# Patient Record
Sex: FEMALE | Race: BLACK OR AFRICAN AMERICAN | Employment: UNEMPLOYED | URBAN - NONMETROPOLITAN AREA
[De-identification: names, ages, dates, MRNs, and addresses within clinical notes are randomized per-mention and may not be internally consistent; named-entity substitution may affect disease eponyms.]

---
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
## 2024-04-25 ENCOUNTER — HOSPITAL ENCOUNTER (EMERGENCY)
Facility: HOSPITAL | Age: 19
Discharge: HOME | End: 2024-04-25
Attending: EMERGENCY MEDICINE

## 2024-04-25 VITALS
RESPIRATION RATE: 17 BRPM | HEART RATE: 91 BPM | BODY MASS INDEX: 24.88 KG/M2 | DIASTOLIC BLOOD PRESSURE: 74 MMHG | TEMPERATURE: 98.1 F | OXYGEN SATURATION: 100 % | HEIGHT: 63 IN | SYSTOLIC BLOOD PRESSURE: 128 MMHG | WEIGHT: 140.4 LBS

## 2024-04-25 DIAGNOSIS — R30.0 DYSURIA: Primary | ICD-10-CM

## 2024-04-25 LAB
ANION GAP SERPL CALC-SCNC: 8 MMOL/L (ref 5–15)
APPEARANCE UR: CLEAR
BASOPHILS # BLD: 0 K/UL (ref 0–0.1)
BASOPHILS NFR BLD: 0 % (ref 0–1)
BILIRUB UR QL: NEGATIVE
BUN SERPL-MCNC: 7 MG/DL (ref 6–20)
BUN/CREAT SERPL: 10 (ref 12–20)
CA-I BLD-MCNC: 9.1 MG/DL (ref 8.5–10.1)
CHLORIDE SERPL-SCNC: 102 MMOL/L (ref 97–108)
CO2 SERPL-SCNC: 29 MMOL/L (ref 21–32)
COLOR UR: NORMAL
CREAT SERPL-MCNC: 0.71 MG/DL (ref 0.55–1.02)
DIFFERENTIAL METHOD BLD: ABNORMAL
EOSINOPHIL # BLD: 0.1 K/UL (ref 0–0.4)
EOSINOPHIL NFR BLD: 1 % (ref 0–7)
ERYTHROCYTE [DISTWIDTH] IN BLOOD BY AUTOMATED COUNT: 13.5 % (ref 11.5–14.5)
GLUCOSE SERPL-MCNC: 114 MG/DL (ref 65–100)
GLUCOSE UR STRIP.AUTO-MCNC: NEGATIVE MG/DL
HCT VFR BLD AUTO: 33.5 % (ref 35–47)
HGB BLD-MCNC: 11.4 G/DL (ref 11.5–16)
HGB UR QL STRIP: NEGATIVE
IMM GRANULOCYTES # BLD AUTO: 0.1 K/UL (ref 0–0.04)
IMM GRANULOCYTES NFR BLD AUTO: 1 % (ref 0–0.5)
KETONES UR QL STRIP.AUTO: NEGATIVE MG/DL
LEUKOCYTE ESTERASE UR QL STRIP.AUTO: NEGATIVE
LYMPHOCYTES # BLD: 2.8 K/UL (ref 0.8–3.5)
LYMPHOCYTES NFR BLD: 34 % (ref 12–49)
MCH RBC QN AUTO: 28.9 PG (ref 26–34)
MCHC RBC AUTO-ENTMCNC: 34 G/DL (ref 30–36.5)
MCV RBC AUTO: 84.8 FL (ref 80–99)
MONOCYTES # BLD: 0.6 K/UL (ref 0–1)
MONOCYTES NFR BLD: 7 % (ref 5–13)
NEUTS SEG # BLD: 4.6 K/UL (ref 1.8–8)
NEUTS SEG NFR BLD: 57 % (ref 32–75)
NITRITE UR QL STRIP.AUTO: NEGATIVE
NRBC # BLD: 0 K/UL (ref 0–0.01)
NRBC BLD-RTO: 0 PER 100 WBC
PH UR STRIP: 6.5 (ref 5–8)
PLATELET # BLD AUTO: 317 K/UL (ref 150–400)
PMV BLD AUTO: 9.4 FL (ref 8.9–12.9)
POTASSIUM SERPL-SCNC: 4 MMOL/L (ref 3.5–5.1)
PROT UR STRIP-MCNC: NEGATIVE MG/DL
RBC # BLD AUTO: 3.95 M/UL (ref 3.8–5.2)
RBC MORPH BLD: ABNORMAL
SODIUM SERPL-SCNC: 139 MMOL/L (ref 136–145)
SP GR UR REFRACTOMETRY: 1.01 (ref 1–1.03)
UROBILINOGEN UR QL STRIP.AUTO: 1 EU/DL (ref 0.2–1)
WBC # BLD AUTO: 8.2 K/UL (ref 3.6–11)

## 2024-04-25 PROCEDURE — 80048 BASIC METABOLIC PNL TOTAL CA: CPT

## 2024-04-25 PROCEDURE — 85025 COMPLETE CBC W/AUTO DIFF WBC: CPT

## 2024-04-25 PROCEDURE — 99283 EMERGENCY DEPT VISIT LOW MDM: CPT

## 2024-04-25 PROCEDURE — 6370000000 HC RX 637 (ALT 250 FOR IP): Performed by: EMERGENCY MEDICINE

## 2024-04-25 PROCEDURE — 81003 URINALYSIS AUTO W/O SCOPE: CPT

## 2024-04-25 RX ORDER — IBUPROFEN 600 MG/1
600 TABLET ORAL
Status: COMPLETED | OUTPATIENT
Start: 2024-04-25 | End: 2024-04-25

## 2024-04-25 RX ORDER — IBUPROFEN 600 MG/1
600 TABLET ORAL 3 TIMES DAILY PRN
Qty: 30 TABLET | Refills: 0 | Status: SHIPPED | OUTPATIENT
Start: 2024-04-25

## 2024-04-25 RX ADMIN — IBUPROFEN 600 MG: 600 TABLET, FILM COATED ORAL at 22:46

## 2024-04-25 ASSESSMENT — ENCOUNTER SYMPTOMS
RESPIRATORY NEGATIVE: 1
ABDOMINAL PAIN: 0
EYES NEGATIVE: 1
VOMITING: 0
NAUSEA: 0
ALLERGIC/IMMUNOLOGIC NEGATIVE: 1
GASTROINTESTINAL NEGATIVE: 1

## 2024-04-25 ASSESSMENT — PAIN - FUNCTIONAL ASSESSMENT: PAIN_FUNCTIONAL_ASSESSMENT: 0-10

## 2024-04-25 ASSESSMENT — PAIN SCALES - GENERAL: PAINLEVEL_OUTOF10: 5

## 2024-04-25 ASSESSMENT — LIFESTYLE VARIABLES
HOW OFTEN DO YOU HAVE A DRINK CONTAINING ALCOHOL: NEVER
HOW MANY STANDARD DRINKS CONTAINING ALCOHOL DO YOU HAVE ON A TYPICAL DAY: PATIENT DOES NOT DRINK

## 2024-04-26 NOTE — ED TRIAGE NOTES
Pt states that for the last 4-5 days she has been pain in her pelvic region that worsens when she urinates. Pt rates pain at this time 5/10 and denies any medication PTA. Pt states that she has been feeling dizzy intermittently for the last 5 hours.

## 2024-04-26 NOTE — ED PROVIDER NOTES
Opening: Spontaneous  Best Verbal Response: Oriented  Best Motor Response: Obeys commands  Veto Coma Scale Score: 15                     CIWA Assessment  BP: 128/74  Pulse: 91                 PHYSICAL EXAM       ED Triage Vitals [04/25/24 2124]   BP Temp Temp src Pulse Respirations SpO2 Height Weight - Scale   128/74 98.1 °F (36.7 °C) -- 91 17 100 % 1.6 m (5' 3\") 63.7 kg (140 lb 6.4 oz)       Physical Exam  Vitals and nursing note reviewed.   HENT:      Head: Normocephalic and atraumatic.      Nose: Nose normal.   Eyes:      Extraocular Movements: Extraocular movements intact.      Pupils: Pupils are equal, round, and reactive to light.   Cardiovascular:      Rate and Rhythm: Normal rate and regular rhythm.   Pulmonary:      Breath sounds: Normal breath sounds.   Abdominal:      General: Abdomen is flat. Bowel sounds are normal.      Palpations: Abdomen is soft.   Musculoskeletal:         General: Normal range of motion.      Cervical back: Normal range of motion and neck supple.   Skin:     General: Skin is warm.   Neurological:      General: No focal deficit present.      Mental Status: She is alert.         DIAGNOSTIC RESULTS     EKG: All EKG's are interpreted by the Emergency Department Physician who either signs or Co-signs this chart in the absence of a cardiologist.        RADIOLOGY:   Non-plain film images such as CT, Ultrasound and MRI are read by the radiologist. Plain radiographic images are visualized and preliminarily interpreted by the emergency physician with the below findings:        Interpretation per the Radiologist below, if available at the time of this note:    No orders to display         ED BEDSIDE ULTRASOUND:   Performed by ED Physician - none    LABS:  Labs Reviewed   URINALYSIS       All other labs were within normal range or not returned as of this dictation.    EMERGENCY DEPARTMENT COURSE and DIFFERENTIAL DIAGNOSIS/MDM:   Vitals:    Vitals:    04/25/24 2124   BP: 128/74   Pulse: 91

## 2024-10-06 ENCOUNTER — HOSPITAL ENCOUNTER (EMERGENCY)
Facility: HOSPITAL | Age: 19
Discharge: HOME | End: 2024-10-06
Attending: EMERGENCY MEDICINE
Payer: MEDICAID

## 2024-10-06 VITALS
TEMPERATURE: 97.5 F | SYSTOLIC BLOOD PRESSURE: 135 MMHG | HEIGHT: 63 IN | DIASTOLIC BLOOD PRESSURE: 85 MMHG | RESPIRATION RATE: 18 BRPM | HEART RATE: 82 BPM | BODY MASS INDEX: 24.8 KG/M2 | OXYGEN SATURATION: 100 % | WEIGHT: 140 LBS

## 2024-10-06 DIAGNOSIS — N30.00 ACUTE CYSTITIS WITHOUT HEMATURIA: Primary | ICD-10-CM

## 2024-10-06 LAB
APPEARANCE UR: ABNORMAL
BACTERIA URNS QL MICRO: ABNORMAL /HPF
BILIRUB UR QL: NEGATIVE
COLOR UR: ABNORMAL
GLUCOSE UR STRIP.AUTO-MCNC: NEGATIVE MG/DL
HGB UR QL STRIP: NEGATIVE
KETONES UR QL STRIP.AUTO: NEGATIVE MG/DL
LEUKOCYTE ESTERASE UR QL STRIP.AUTO: ABNORMAL
NITRITE UR QL STRIP.AUTO: NEGATIVE
PH UR STRIP: 7 (ref 5–8)
PROT UR STRIP-MCNC: NEGATIVE MG/DL
RBC #/AREA URNS HPF: ABNORMAL /HPF (ref 0–3)
SP GR UR REFRACTOMETRY: 1.01 (ref 1–1.03)
URINE CULTURE IF INDICATED: ABNORMAL
UROBILINOGEN UR QL STRIP.AUTO: 1 EU/DL (ref 0.2–1)
WBC URNS QL MICRO: ABNORMAL /HPF (ref 0–5)

## 2024-10-06 PROCEDURE — 87147 CULTURE TYPE IMMUNOLOGIC: CPT

## 2024-10-06 PROCEDURE — 6370000000 HC RX 637 (ALT 250 FOR IP): Performed by: EMERGENCY MEDICINE

## 2024-10-06 PROCEDURE — 87086 URINE CULTURE/COLONY COUNT: CPT

## 2024-10-06 PROCEDURE — 99283 EMERGENCY DEPT VISIT LOW MDM: CPT

## 2024-10-06 PROCEDURE — 81001 URINALYSIS AUTO W/SCOPE: CPT

## 2024-10-06 RX ORDER — CEPHALEXIN 500 MG/1
500 CAPSULE ORAL 3 TIMES DAILY
Qty: 15 CAPSULE | Refills: 0 | Status: SHIPPED | OUTPATIENT
Start: 2024-10-06 | End: 2024-10-11

## 2024-10-06 RX ORDER — PHENAZOPYRIDINE HYDROCHLORIDE 95 MG/1
95 TABLET ORAL 3 TIMES DAILY PRN
Qty: 9 TABLET | Refills: 0 | Status: SHIPPED | OUTPATIENT
Start: 2024-10-06 | End: 2024-10-09

## 2024-10-06 RX ADMIN — CEPHALEXIN 500 MG: 250 CAPSULE ORAL at 14:06

## 2024-10-06 ASSESSMENT — PAIN DESCRIPTION - LOCATION: LOCATION: FLANK

## 2024-10-06 ASSESSMENT — PAIN - FUNCTIONAL ASSESSMENT
PAIN_FUNCTIONAL_ASSESSMENT: 0-10
PAIN_FUNCTIONAL_ASSESSMENT: 0-10

## 2024-10-06 ASSESSMENT — PAIN SCALES - GENERAL
PAINLEVEL_OUTOF10: 5
PAINLEVEL_OUTOF10: 5

## 2024-10-06 NOTE — DISCHARGE INSTRUCTIONS
Thank you for choosing our Emergency Department for your care.  It is our privilege to care for you in your time of need.  In the next several days, you may receive a survey via email or mailed to your home about your experience with our team.  We would greatly appreciate you taking a few minutes to complete the survey, as we use this information to learn what we have done well and what we could be doing better. Thank you for trusting us with your care!    Below you will find a list of your tests from today's visit.   Labs  Recent Results (from the past 12 hour(s))   Urinalysis with Reflex to Culture    Collection Time: 10/06/24  1:00 PM    Specimen: Urine   Result Value Ref Range    Color, UA Yellow/Straw      Appearance Hazy (A) Clear      Specific Gravity, UA 1.015 1.003 - 1.030      pH, Urine 7.0 5.0 - 8.0      Protein, UA Negative Negative mg/dL    Glucose, Ur Negative Negative mg/dL    Ketones, Urine Negative Negative mg/dL    Bilirubin, Urine Negative Negative      Blood, Urine Negative Negative      Urobilinogen, Urine 1.0 0.2 - 1.0 EU/dL    Nitrite, Urine Negative Negative      Leukocyte Esterase, Urine Large (A) Negative      WBC, UA 20-50 0 - 5 /hpf    RBC, UA 0-5 0 - 3 /hpf    BACTERIA, URINE 1+ (A) Negative /hpf    Urine Culture if Indicated Urine Culture Ordered (A) Culture not indicated by UA result         Radiologic Studies  No orders to display     ------------------------------------------------------------------------------------------------------------  The evaluation and treatment you received in the Emergency Department were for an urgent problem. It is important that you follow-up with a doctor, nurse practitioner, or physician assistant to:  (1) confirm your diagnosis,  (2) re-evaluation of changes in your illness and treatment, and (3) for ongoing care. Please take your discharge instructions with you when you go to your follow-up appointment.     If you have any problem arranging a

## 2024-10-06 NOTE — ED PROVIDER NOTES
stone, STI.  Will obtain UA.     Discussed with the patient diagnosis and test results and all questioned fully answered.  They understand the importance of staying well hydrated, taking antibiotics as prescribed to completion and using OTC pyridium as desired.  She will PCP if any problems arise.     We discussed follow up with urology/GYN due to recurrent symptoms. No abd ttp or flank pain to concern for intraabdominal catastrophe. Return precautions dicussed.       Records Reviewed (source and summary of external notes): Prior medical records and Nursing notes.    Vitals:    Vitals:    10/06/24 1258   BP: 135/85   Pulse: 82   Resp: 18   Temp: 97.5 °F (36.4 °C)   SpO2: 100%   Weight: 63.5 kg (140 lb)   Height: 1.6 m (5' 3\")        ED COURSE          Patient was given the following medications:  Medications   cephALEXin (KEFLEX) capsule 500 mg (500 mg Oral Given 10/6/24 1406)       CONSULTS: See ED Course/MDM for further details.  None        PROCEDURES   Unless otherwise noted above, none  Procedures      CRITICAL CARE TIME   Patient does not meet Critical Care Time, 0 minutes    ED IMPRESSION     1. Acute cystitis without hematuria          DISPOSITION/PLAN   DISPOSITION Decision To Discharge 10/06/2024 02:09:55 PM  Condition at Disposition: Data Unavailable    Discharge Note: The patient is stable for discharge home. The signs, symptoms, diagnosis, and discharge instructions have been discussed, understanding conveyed, and agreed upon. The patient is to follow up as recommended or return to ER should their symptoms worsen.      PATIENT REFERRED TO:  your primary care provider    Call in 3 days      Rukhsana Lambert MD  [address] [place] [address]  [address] [place] [id number] [phone]    Schedule an appointment as soon as possible for a visit in 1 week  for followup    Rey Salazar MD  [address] [facility] [address] [phone]    Schedule an appointment as soon as possible for a visit

## 2024-10-06 NOTE — ED TRIAGE NOTES
Pt reports bilateral flank pain x4 months in which she presents with either urinary frequency or retention. Today she rates bilateral pain at 5/10 with urinary discomfort.

## 2024-10-08 LAB
BACTERIA SPEC CULT: ABNORMAL
COLONY COUNT, CNT: ABNORMAL
COLONY COUNT, CNT: ABNORMAL
Lab: ABNORMAL

## 2025-05-06 ENCOUNTER — HOSPITAL ENCOUNTER (EMERGENCY)
Facility: HOSPITAL | Age: 20
Discharge: HOME | End: 2025-05-06
Attending: EMERGENCY MEDICINE
Payer: MEDICAID

## 2025-05-06 VITALS
HEART RATE: 72 BPM | DIASTOLIC BLOOD PRESSURE: 73 MMHG | RESPIRATION RATE: 18 BRPM | BODY MASS INDEX: 24.8 KG/M2 | SYSTOLIC BLOOD PRESSURE: 117 MMHG | WEIGHT: 140 LBS | OXYGEN SATURATION: 100 % | TEMPERATURE: 97.8 F | HEIGHT: 63 IN

## 2025-05-06 DIAGNOSIS — L30.9 DERMATITIS: ICD-10-CM

## 2025-05-06 DIAGNOSIS — H10.33 ACUTE CONJUNCTIVITIS OF BOTH EYES, UNSPECIFIED ACUTE CONJUNCTIVITIS TYPE: Primary | ICD-10-CM

## 2025-05-06 PROCEDURE — 99283 EMERGENCY DEPT VISIT LOW MDM: CPT

## 2025-05-06 PROCEDURE — 6370000000 HC RX 637 (ALT 250 FOR IP): Performed by: EMERGENCY MEDICINE

## 2025-05-06 RX ORDER — ERYTHROMYCIN 5 MG/G
OINTMENT OPHTHALMIC
Status: COMPLETED | OUTPATIENT
Start: 2025-05-06 | End: 2025-05-06

## 2025-05-06 RX ORDER — TRIAMCINOLONE ACETONIDE 1 MG/G
CREAM TOPICAL
Qty: 30 G | Refills: 1 | Status: SHIPPED | OUTPATIENT
Start: 2025-05-06

## 2025-05-06 RX ADMIN — ERYTHROMYCIN: 5 OINTMENT OPHTHALMIC at 09:36

## 2025-05-06 RX ADMIN — FLUORESCEIN SODIUM 1 MG: 1 STRIP OPHTHALMIC at 09:36

## 2025-05-06 ASSESSMENT — PAIN DESCRIPTION - ORIENTATION: ORIENTATION: LEFT

## 2025-05-06 ASSESSMENT — VISUAL ACUITY: OU: 1

## 2025-05-06 ASSESSMENT — PAIN DESCRIPTION - LOCATION: LOCATION: EYE

## 2025-05-06 ASSESSMENT — PAIN SCALES - GENERAL: PAINLEVEL_OUTOF10: 3

## 2025-05-06 NOTE — ED NOTES
In to review DC instructions and requests medication for rash to neck and back. Dr Reyes at bedside to eval and will send in prescription. Pt aware.

## 2025-05-06 NOTE — ED TRIAGE NOTES
Patient reports that she started having pain to left eye intermittently x1 week and has some pain to right eye as well patient also endorses irritation no discharge

## 2025-05-06 NOTE — ED PROVIDER NOTES
[facility] EMERGENCY DEPARTMENT  EMERGENCY DEPARTMENT ENCOUNTER      Pt Name: Jennifer Montenegro  MRN: [id number]  Birthdate 2005  Date of evaluation: 5/6/2025  Provider: Dio Reyes MD    CHIEF COMPLAINT       Chief Complaint   Patient presents with    eye irritation         HISTORY OF PRESENT ILLNESS   (Location/Symptom, Timing/Onset, Context/Setting, Quality, Duration, Modifying Factors, Severity)  Note limiting factors.   Jennifer Montenegro is a 19 y.o. female who presents to the emergency department eye irritation the last 5 to 7 days.  Right eye is better left eye still irritated.  No clear contact with pinkeye no change in vision denies discharge itchy sensation.    Late complaint from patient regarding a chronic dermatologic condition she states was dermatitis she notes irritation of the skin in the anterior neck and back question previously treated with salicylic acid solution.  See addendum for exam    HPI    Nursing Notes were reviewed.    REVIEW OF SYSTEMS    (2-9 systems for level 4, 10 or more for level 5)     Review of Systems   All other systems reviewed and are negative.      Except as noted above the remainder of the review of systems was reviewed and negative.       PAST MEDICAL HISTORY     Past Medical History:   Diagnosis Date    Recurrent vomiting          SURGICAL HISTORY       Past Surgical History:   Procedure Laterality Date    APPENDECTOMY           CURRENT MEDICATIONS       Previous Medications    IBUPROFEN (ADVIL;MOTRIN) 600 MG TABLET    Take 1 tablet by mouth 3 times daily as needed for Pain       ALLERGIES     Patient has no known allergies.    FAMILY HISTORY     History reviewed. No pertinent family history.       SOCIAL HISTORY       Social History     Socioeconomic History    Marital status: Single     Spouse name: None    Number of children: None    Years of education: None    Highest education level: None   Tobacco Use    Smoking status: Never    Smokeless tobacco: Never